# Patient Record
Sex: MALE | Race: WHITE | NOT HISPANIC OR LATINO | Employment: UNEMPLOYED | URBAN - METROPOLITAN AREA
[De-identification: names, ages, dates, MRNs, and addresses within clinical notes are randomized per-mention and may not be internally consistent; named-entity substitution may affect disease eponyms.]

---

## 2023-08-07 ENCOUNTER — OFFICE VISIT (OUTPATIENT)
Dept: FAMILY MEDICINE CLINIC | Facility: CLINIC | Age: 43
End: 2023-08-07
Payer: COMMERCIAL

## 2023-08-07 VITALS
OXYGEN SATURATION: 99 % | TEMPERATURE: 98 F | HEIGHT: 70 IN | WEIGHT: 242 LBS | SYSTOLIC BLOOD PRESSURE: 112 MMHG | DIASTOLIC BLOOD PRESSURE: 84 MMHG | RESPIRATION RATE: 14 BRPM | BODY MASS INDEX: 34.65 KG/M2 | HEART RATE: 59 BPM

## 2023-08-07 DIAGNOSIS — Z76.89 ESTABLISHING CARE WITH NEW DOCTOR, ENCOUNTER FOR: ICD-10-CM

## 2023-08-07 DIAGNOSIS — I10 HYPERTENSION, BENIGN: Primary | ICD-10-CM

## 2023-08-07 PROBLEM — K51.90 ULCERATIVE COLITIS: Status: ACTIVE | Noted: 2018-05-22

## 2023-08-07 PROCEDURE — 99202 OFFICE O/P NEW SF 15 MIN: CPT | Performed by: PHYSICIAN ASSISTANT

## 2023-08-07 RX ORDER — LISINOPRIL 40 MG/1
40 TABLET ORAL DAILY
COMMUNITY
Start: 2020-11-01 | End: 2023-08-07 | Stop reason: SDUPTHER

## 2023-08-07 RX ORDER — USTEKINUMAB 90 MG/ML
INJECTION, SOLUTION SUBCUTANEOUS
COMMUNITY
Start: 2021-01-01

## 2023-08-07 RX ORDER — LISINOPRIL 40 MG/1
40 TABLET ORAL DAILY
Qty: 90 TABLET | Refills: 2 | Status: SHIPPED | OUTPATIENT
Start: 2023-08-07

## 2023-08-07 NOTE — PROGRESS NOTES
"Chief Complaint  Establish Care and Hypertension (Medication refill )    Subjective          Fred Sewell IV presents to [facility]  History of Present Illness  Didier ,\"Ortiz\",is a 43-year-old male who presents to establish care with new provider with hypertension management.  States overall he is feeling well at office visit today.  Currently sees GI at [facility] for his ulcerative colitis.  Currently stable on medication.  He has been taking the lisinopril for the past several years for hypertension.  Does not check his blood pressure at home.  Has had no side effects with the lisinopril.  Currently he walks 1 and half miles daily.  Sleep has been normal.  Diet has been healthy.  Bowel movements are regular without dark black tarry stools.  Ortiz denied any chest pain, shortness of finger, cough, wheezing, abdominal pain or GI upset.     Objective   Vital Signs:   /84 (BP Location: Left arm, Patient Position: Sitting, Cuff Size: Adult)   Pulse 59   Temp 98 øF (36.7 øC)   Resp 14   Ht 177.8 cm (70\")   Wt 110 kg (242 lb)   SpO2 99%   BMI 34.72 kg/mý     Physical Exam  Vitals and nursing note reviewed.   Constitutional:       Appearance: Normal appearance. He is well-developed and well-groomed. He is obese.   HENT:      Head: Normocephalic and atraumatic.   Neck:      Thyroid: No thyroid mass, thyromegaly or thyroid tenderness.      Vascular: No carotid bruit.      Trachea: Trachea and phonation normal. No tracheal tenderness.   Cardiovascular:      Rate and Rhythm: Normal rate and regular rhythm.      Pulses: Normal pulses.      Heart sounds: Normal heart sounds, S1 normal and S2 normal. No murmur heard.  Pulmonary:      Effort: Pulmonary effort is normal.      Breath sounds: Normal breath sounds and air entry.   Abdominal:      General: Bowel sounds are normal.      Palpations: Abdomen is soft. There is no hepatomegaly.      Tenderness: There is no " "abdominal tenderness. There is no right CVA tenderness, left CVA tenderness, guarding or rebound. Negative signs include Shen's sign, Rovsing's sign, McBurney's sign, psoas sign and obturator sign.   Musculoskeletal:      Cervical back: Neck supple.      Right lower leg: No edema.      Left lower leg: No edema.   Skin:     General: Skin is warm and dry.      Capillary Refill: Capillary refill takes less than 2 seconds.      Comments: Tattoo on left forearm   Neurological:      Mental Status: He is alert and oriented to person, place, and time.   Psychiatric:         Attention and Perception: Attention and perception normal.         Mood and Affect: Mood and affect normal.         Speech: Speech normal.         Behavior: Behavior normal. Behavior is cooperative.         Thought Content: Thought content normal.         Cognition and Memory: Cognition and memory normal.         Judgment: Judgment normal.      Result Review :                 Assessment and Plan    Diagnoses and all orders for this visit:    1. Hypertension, benign (Primary)    2. Establishing care with new doctor, encounter for    Other orders  -     lisinopril (PRINIVIL,ZESTRIL) 40 MG tablet; Take 1 tablet by mouth Daily.  Dispense: 90 tablet; Refill: 2    Fred,\"Ortiz\" was seen in office today to establish care with new provider with hypertension.  I have reviewed his health history form with him today.  We will try to obtain prior medical records for my review.  I have rechecked blood pressure and got 112/84 in left arm.  Doing well with the blood pressure medication.  I refilled the lisinopril to pharmacy. Ortiz will return to office in 6 months for annual physical exam with hypertension management.    I spent 24 minutes caring for Fred on this date of service. This time includes time spent by me in the following activities:preparing for the visit, obtaining and/or reviewing a separately obtained history, performing a medically appropriate " examination and/or evaluation , counseling and educating the patient/family/caregiver, ordering medications, tests, or procedures, and documenting information in the medical record  Follow Up   Return in about 6 months (around 2/7/2024), or HTN labs prior, for Annual.  Patient was given instructions and counseling regarding his condition or for health maintenance advice. Please see specific information pulled into the AVS if appropriate.     [facility]  [address] [place] [id number]  Dept: [phone]  Dept Fax: [phone]  Loc: [phone]  Loc Fax: [phone]

## 2023-08-09 ENCOUNTER — PATIENT ROUNDING (BHMG ONLY) (OUTPATIENT)
Dept: FAMILY MEDICINE CLINIC | Facility: CLINIC | Age: 43
End: 2023-08-09
Payer: COMMERCIAL

## 2024-04-08 RX ORDER — LISINOPRIL 40 MG/1
40 TABLET ORAL DAILY
Qty: 90 TABLET | Refills: 0 | Status: SHIPPED | OUTPATIENT
Start: 2024-04-08

## 2024-04-09 RX ORDER — LISINOPRIL 40 MG/1
40 TABLET ORAL DAILY
Qty: 90 TABLET | Refills: 1 | OUTPATIENT
Start: 2024-04-09

## 2024-06-24 NOTE — PROGRESS NOTES
Patient ID: Fred Sewell IV is a 44 y.o. male     Patient Care Team:  Head, CRISTIANE Valdes as PCP - General (Nurse Practitioner)    Subjective     Chief Complaint   Patient presents with    [facility]    Hypertension    Annual Exam       History of Present Illness    Fred Sewell IV was a patient of Susan Orr PA-C who is no longer with our practice.  I will be taking over this patient's primary care.  This is the first time patient is seeing me.  Last seen Susan to [facility] on August 7, 2023.  This was for continued management concerning hypertension with lisinopril 40 mg daily.  He is past due for annual physical exam and blood work.    He presents to [facility] today for continued management of hypertension.     He is followed by [facility] for management of ulcerative colitis.  Last seen on June 6, 2023.  Last colonoscopy was Adriana 10, 2024.  He is on Stelara injections with good control of symptoms.    Has had recent blood work completed at [facility].    Health Habits:  Dental Exam: Up to date  Eye Exam: No problems.    Diet:  Exercise: 3 times/week.  Current exercise activities include: walking  Colonoscopy: 6/10/24.      He denies any complaints of fever, chills, cough, chest pain, shortness of air, abdominal pain, nausea, or any other concerns.     The following portions of the patient's history were reviewed and updated as appropriate: allergies, current medications, past family history, past medical history, past social history, past surgical history and problem list.       ROS    Vitals:    06/25/24 1309   BP: 126/84   Pulse: 75   Temp: 98.2 °F (36.8 °C)   SpO2: 96%       Documented weights    06/25/24 1309   Weight: 113 kg (249 lb 9.6 oz)     Body mass index is 35.81 kg/m².    No results found for this or any previous visit.    Data reviewed : labs  COMPREHENSIVE METABOLIC PANEL (06/25/2024 08:33)    CBC AND DIFFERENTIAL (06/25/2024 08:33)    Objective      Physical Exam  Constitutional:       Appearance: He is well-developed.   HENT:      Head: Normocephalic and atraumatic.      Right Ear: Tympanic membrane normal.      Left Ear: Tympanic membrane normal.   Eyes:      Pupils: Pupils are equal, round, and reactive to light.   Cardiovascular:      Rate and Rhythm: Normal rate and regular rhythm.      Heart sounds: Normal heart sounds. No murmur heard.  Pulmonary:      Effort: Pulmonary effort is normal.      Breath sounds: Normal breath sounds. No wheezing, rhonchi or rales.   Abdominal:      General: Bowel sounds are normal.      Palpations: Abdomen is soft.      Tenderness: There is no abdominal tenderness.   Musculoskeletal:         General: No tenderness. Normal range of motion.      Cervical back: Normal range of motion and neck supple.   Skin:     General: Skin is warm and dry.      Findings: No erythema or rash.   Neurological:      Mental Status: He is alert and oriented to person, place, and time.   Psychiatric:         Behavior: Behavior normal.         BMI is >= 30 and <35. (Class 1 Obesity). The following options were offered after discussion;: exercise counseling/recommendations, nutrition counseling/recommendations, and information on healthy weight added to patient's after visit summary       Assessment & Plan     Assessment/Plan     Diagnoses and all orders for this visit:    1. Annual physical exam (Primary)    2. Encounter for hepatitis C screening test for low risk patient  -     HCV Antibody Rfx To Qnt PCR    3. Hypertension, benign  -     Lipid Panel With / Chol / HDL Ratio  -     TSH Rfx On Abnormal To Free T4  -     lisinopril (PRINIVIL,ZESTRIL) 40 MG tablet; Take 1 tablet by mouth Daily.  Dispense: 90 tablet; Refill: 3    4. Hyperglycemia  -     Hemoglobin A1c    5. Screening for diabetes mellitus  -     Hemoglobin A1c          Summary:  Fred Sewell IV has been doing well since he was last seen.  Blood pressure currently stable at  126/84.  He had recent labs completed for GI however will check TSH, lipid panel, hemoglobin A1c in office today and notify of results.  If all are stable, strict return to office in 1 year for next annual physical.        Follow Up:  Return for Labs today - F/U 1 year annual with fasting labs week before.  .    In the meantime, instructed to contact us sooner for any problems or concerns.    Patient was given instructions and counseling regarding condition or for health maintenance advice.  Please see specific information pulled into the AVS if appropriate.          Mandy Watts, APRN  Family Medicine  [facility]  06/25/24  16:57 EDT

## 2024-06-25 ENCOUNTER — OFFICE VISIT (OUTPATIENT)
Dept: FAMILY MEDICINE CLINIC | Facility: CLINIC | Age: 44
End: 2024-06-25
Payer: COMMERCIAL

## 2024-06-25 VITALS
HEART RATE: 75 BPM | WEIGHT: 249.6 LBS | OXYGEN SATURATION: 96 % | SYSTOLIC BLOOD PRESSURE: 126 MMHG | TEMPERATURE: 98.2 F | HEIGHT: 70 IN | DIASTOLIC BLOOD PRESSURE: 84 MMHG | BODY MASS INDEX: 35.73 KG/M2

## 2024-06-25 DIAGNOSIS — I10 HYPERTENSION, BENIGN: ICD-10-CM

## 2024-06-25 DIAGNOSIS — Z00.00 ANNUAL PHYSICAL EXAM: Primary | ICD-10-CM

## 2024-06-25 DIAGNOSIS — Z11.59 ENCOUNTER FOR HEPATITIS C SCREENING TEST FOR LOW RISK PATIENT: ICD-10-CM

## 2024-06-25 DIAGNOSIS — R73.9 HYPERGLYCEMIA: ICD-10-CM

## 2024-06-25 DIAGNOSIS — Z13.1 SCREENING FOR DIABETES MELLITUS: ICD-10-CM

## 2024-06-25 PROCEDURE — 99396 PREV VISIT EST AGE 40-64: CPT | Performed by: NURSE PRACTITIONER

## 2024-06-25 RX ORDER — LISINOPRIL 40 MG/1
40 TABLET ORAL DAILY
Qty: 90 TABLET | Refills: 3 | Status: SHIPPED | OUTPATIENT
Start: 2024-06-25

## 2024-06-26 LAB
CHOLEST SERPL-MCNC: 289 MG/DL (ref 0–200)
CHOLEST/HDLC SERPL: 8.26 {RATIO}
HBA1C MFR BLD: 5.7 % (ref 4.8–5.6)
HCV AB SERPL QL IA: NORMAL
HCV IGG SERPL QL IA: NON REACTIVE
HDLC SERPL-MCNC: 35 MG/DL (ref 40–60)
LDLC SERPL CALC-MCNC: 168 MG/DL (ref 0–100)
TRIGL SERPL-MCNC: 441 MG/DL (ref 0–150)
TSH SERPL DL<=0.005 MIU/L-ACNC: 2.31 UIU/ML (ref 0.27–4.2)
VLDLC SERPL CALC-MCNC: 86 MG/DL (ref 5–40)

## 2024-06-28 DIAGNOSIS — E78.2 MIXED HYPERLIPIDEMIA: Primary | ICD-10-CM

## 2024-06-28 RX ORDER — ATORVASTATIN CALCIUM 10 MG/1
10 TABLET, FILM COATED ORAL DAILY
Qty: 90 TABLET | Refills: 0 | Status: SHIPPED | OUTPATIENT
Start: 2024-06-28

## 2024-09-25 DIAGNOSIS — E78.2 MIXED HYPERLIPIDEMIA: ICD-10-CM

## 2024-09-26 RX ORDER — ATORVASTATIN CALCIUM 10 MG/1
10 TABLET, FILM COATED ORAL DAILY
Qty: 90 TABLET | Refills: 0 | Status: SHIPPED | OUTPATIENT
Start: 2024-09-26

## 2024-12-17 DIAGNOSIS — E78.2 MIXED HYPERLIPIDEMIA: ICD-10-CM

## 2024-12-17 RX ORDER — ATORVASTATIN CALCIUM 10 MG/1
10 TABLET, FILM COATED ORAL DAILY
Qty: 90 TABLET | Refills: 0 | OUTPATIENT
Start: 2024-12-17

## 2024-12-18 DIAGNOSIS — E78.2 MIXED HYPERLIPIDEMIA: ICD-10-CM

## 2024-12-18 RX ORDER — ATORVASTATIN CALCIUM 10 MG/1
10 TABLET, FILM COATED ORAL DAILY
Qty: 90 TABLET | Refills: 0 | OUTPATIENT
Start: 2024-12-18

## 2024-12-19 DIAGNOSIS — E78.2 MIXED HYPERLIPIDEMIA: Primary | ICD-10-CM

## 2024-12-19 DIAGNOSIS — E78.2 MIXED HYPERLIPIDEMIA: ICD-10-CM

## 2024-12-19 RX ORDER — ATORVASTATIN CALCIUM 10 MG/1
10 TABLET, FILM COATED ORAL DAILY
Qty: 90 TABLET | Refills: 0 | OUTPATIENT
Start: 2024-12-19

## 2024-12-23 DIAGNOSIS — E78.2 MIXED HYPERLIPIDEMIA: ICD-10-CM

## 2024-12-23 RX ORDER — ATORVASTATIN CALCIUM 10 MG/1
10 TABLET, FILM COATED ORAL DAILY
Qty: 90 TABLET | Refills: 0 | Status: SHIPPED | OUTPATIENT
Start: 2024-12-23 | End: 2024-12-30

## 2024-12-30 DIAGNOSIS — E78.2 MIXED HYPERLIPIDEMIA: ICD-10-CM

## 2024-12-30 RX ORDER — ATORVASTATIN CALCIUM 20 MG/1
20 TABLET, FILM COATED ORAL DAILY
Qty: 90 TABLET | Refills: 1 | Status: SHIPPED | OUTPATIENT
Start: 2024-12-30

## 2025-06-11 DIAGNOSIS — R73.9 HYPERGLYCEMIA: ICD-10-CM

## 2025-06-11 DIAGNOSIS — Z00.00 ANNUAL PHYSICAL EXAM: Primary | ICD-10-CM

## 2025-06-11 DIAGNOSIS — E78.2 MIXED HYPERLIPIDEMIA: ICD-10-CM

## 2025-07-01 ENCOUNTER — OFFICE VISIT (OUTPATIENT)
Dept: FAMILY MEDICINE CLINIC | Facility: CLINIC | Age: 45
End: 2025-07-01
Payer: COMMERCIAL

## 2025-07-01 VITALS
TEMPERATURE: 98.9 F | WEIGHT: 252 LBS | HEART RATE: 70 BPM | BODY MASS INDEX: 36.08 KG/M2 | SYSTOLIC BLOOD PRESSURE: 120 MMHG | OXYGEN SATURATION: 98 % | HEIGHT: 70 IN | DIASTOLIC BLOOD PRESSURE: 70 MMHG

## 2025-07-01 DIAGNOSIS — Z23 IMMUNIZATION DUE: ICD-10-CM

## 2025-07-01 DIAGNOSIS — Z13.6 ENCOUNTER FOR SCREENING FOR CORONARY ARTERY DISEASE: ICD-10-CM

## 2025-07-01 DIAGNOSIS — I10 HYPERTENSION, BENIGN: ICD-10-CM

## 2025-07-01 DIAGNOSIS — E78.2 MIXED HYPERLIPIDEMIA: ICD-10-CM

## 2025-07-01 DIAGNOSIS — Z00.00 ANNUAL PHYSICAL EXAM: Primary | ICD-10-CM

## 2025-07-01 RX ORDER — ATORVASTATIN CALCIUM 20 MG/1
20 TABLET, FILM COATED ORAL DAILY
Qty: 90 TABLET | Refills: 3 | Status: SHIPPED | OUTPATIENT
Start: 2025-07-01

## 2025-07-01 RX ORDER — USTEKINUMAB-AAUZ 90 MG/ML
INJECTION, SOLUTION SUBCUTANEOUS
COMMUNITY
Start: 2025-05-13

## 2025-07-01 RX ORDER — LISINOPRIL 40 MG/1
40 TABLET ORAL DAILY
Qty: 90 TABLET | Refills: 3 | Status: SHIPPED | OUTPATIENT
Start: 2025-07-01

## 2025-07-01 NOTE — PROGRESS NOTES
Chief Complaint   Patient presents with    Annual Exam    Hypertension    Hyperlipidemia       Patient Care Team:  Head, CRISTIANE Valdes as PCP - General (Nurse Practitioner)    Terrie Sewell IV is a 45 y.o. male and is here for a yearly physical exam. The patient reports no problems.    History of Present Illness  The patient presents for a yearly checkup.    He is considering a cardiac evaluation due to a significant family history of heart disease, including his brother's first heart attack at the age of 45. He expresses concern about potential heart blockages and seeks advice on appropriate tests. He does not adhere to any specific diet and continues to walk about three times a week. He does not monitor his blood pressure at home and reports no symptoms of hypertension such as headaches. He is on Lipitor for cholesterol management and lisinopril for blood pressure control. He reports no side effects from his current medications, although he does experience knee pain, which he attributes to a pre-existing condition.    He is under the care of a gastroenterologist for ulcerative colitis, with his last colonoscopy performed in 06/2024. He is on injections for ulcerative colitis and has good control of symptoms. He has not received a tetanus vaccine in the past decade and declines the influenza vaccine due to previous adverse reactions.    FAMILY HISTORY  His brother had his first heart attack at age 45.    Results  Labs   - CBC: Normal   - Glucose: 109 mg/dL   - Kidney function: Normal   - Liver function tests: Normal   - Total cholesterol: 171 mg/dL   - LDL: 98 mg/dL   - Triglycerides: 252 mg/dL   - HDL: 30 mg/dL   - Thyroid function test: Normal   - Urine test: Normal   - Hemoglobin A1c: Prediabetic range    Health Habits:  Dental Exam: Up to date  Eye Exam: No problems.    Diet:  Exercise: 3 times/week.  Current exercise activities include: walking  Colonoscopy: 6/10/24.      The following portions  "of the patient's history were reviewed and updated as appropriate: allergies, current medications, past family history, past medical history, past social history, past surgical history, and problem list.    Social and Family and Surgical History reviewed and updated today, see Rooming tab.    Health History, Preventive Measures and Vaccination flow sheets reviewed and updated today.    Patient's current medical chart in Epic; including previous office notes, imaging, labs, specialist's evaluation either in notes or in Media tab reviewed today.    Other pertinent medical information also reviewed thru Care Everywhere function is also reviewed today.    Review of Systems  Review of Systems  Vitals:    07/01/25 1315   BP: 120/70   BP Location: Left arm   Patient Position: Sitting   Cuff Size: Adult   Pulse: 70   Temp: 98.9 °F (37.2 °C)   SpO2: 98%   Weight: 114 kg (252 lb)   Height: 177.8 cm (70\")     Wt Readings from Last 3 Encounters:   07/01/25 114 kg (252 lb)   06/25/24 113 kg (249 lb 9.6 oz)   08/07/23 110 kg (242 lb)       Physical Exam  Vitals reviewed.   Constitutional:       General: He is not in acute distress.     Appearance: He is well-developed.   HENT:      Head: Normocephalic and atraumatic.      Right Ear: Tympanic membrane normal.      Left Ear: Tympanic membrane normal.      Nose: No congestion.   Eyes:      Pupils: Pupils are equal, round, and reactive to light.   Cardiovascular:      Rate and Rhythm: Normal rate and regular rhythm.      Heart sounds: Normal heart sounds. No murmur heard.  Pulmonary:      Effort: Pulmonary effort is normal.      Breath sounds: Normal breath sounds. No wheezing, rhonchi or rales.   Abdominal:      General: Bowel sounds are normal.      Palpations: Abdomen is soft. There is no hepatomegaly or splenomegaly.      Tenderness: There is no abdominal tenderness.   Musculoskeletal:         General: No tenderness. Normal range of motion.      Cervical back: Normal range of " motion and neck supple.      Right lower leg: No edema.      Left lower leg: No edema.   Skin:     General: Skin is warm and dry.      Findings: No erythema or rash.   Neurological:      Mental Status: He is alert and oriented to person, place, and time.   Psychiatric:         Mood and Affect: Mood normal.         Behavior: Behavior normal.         ECG 12 Lead    Date/Time: 7/1/2025 3:18 PM  Performed by: Mandy Watts APRN    Authorized by: Mandy Watts APRN  Previous ECG: no previous ECG available  Rhythm: sinus rhythm  Rate: normal  Conduction: conduction normal  ST Segments: ST segments normal  T Waves: T waves normal  QRS axis: normal  Other: no other findings    Clinical impression: normal ECG          The 10-year ASCVD risk score (Luis Felipe DHILLON, et al., 2019) is: 3.1%    Values used to calculate the score:      Age: 45 years      Sex: Male      Is Non- : No      Diabetic: No      Tobacco smoker: No      Systolic Blood Pressure: 120 mmHg      Is BP treated: Yes      HDL Cholesterol: 30 mg/dL      Total Cholesterol: 171 mg/dL     Results for orders placed or performed in visit on 06/19/25   CBC (No Diff)    Collection Time: 06/19/25  8:27 AM    Specimen: Blood   Result Value Ref Range    WBC 8.8 3.4 - 10.8 x10E3/uL    RBC 5.44 4.14 - 5.80 x10E6/uL    Hemoglobin 15.7 13.0 - 17.7 g/dL    Hematocrit 47.8 37.5 - 51.0 %    MCV 88 79 - 97 fL    MCH 28.9 26.6 - 33.0 pg    MCHC 32.8 31.5 - 35.7 g/dL    RDW 14.0 11.6 - 15.4 %    Platelets 278 150 - 450 x10E3/uL   Comprehensive Metabolic Panel    Collection Time: 06/19/25  8:27 AM    Specimen: Blood   Result Value Ref Range    Glucose 109 (H) 70 - 99 mg/dL    BUN 19 6 - 24 mg/dL    Creatinine 1.03 0.76 - 1.27 mg/dL    EGFR Result 91 >59 mL/min/1.73    BUN/Creatinine Ratio 18 9 - 20    Sodium 140 134 - 144 mmol/L    Potassium 4.4 3.5 - 5.2 mmol/L    Chloride 105 96 - 106 mmol/L    Total CO2 20 20 - 29 mmol/L    Calcium 9.0 8.7 - 10.2 mg/dL    Total  Protein 6.9 6.0 - 8.5 g/dL    Albumin 4.1 4.1 - 5.1 g/dL    Globulin 2.8 1.5 - 4.5 g/dL    Total Bilirubin 0.6 0.0 - 1.2 mg/dL    Alkaline Phosphatase 82 44 - 121 IU/L    AST (SGOT) 22 0 - 40 IU/L    ALT (SGPT) 27 0 - 44 IU/L   Lipid Panel With / Chol / HDL Ratio    Collection Time: 06/19/25  8:27 AM    Specimen: Blood   Result Value Ref Range    Total Cholesterol 171 100 - 199 mg/dL    Triglycerides 252 (H) 0 - 149 mg/dL    HDL Cholesterol 30 (L) >39 mg/dL    VLDL Cholesterol Vance 43 (H) 5 - 40 mg/dL    LDL Chol Calc (NIH) 98 0 - 99 mg/dL    Chol/HDL Ratio 5.7 (H) 0.0 - 5.0 ratio   TSH Rfx On Abnormal To Free T4    Collection Time: 06/19/25  8:27 AM    Specimen: Blood   Result Value Ref Range    TSH 1.140 0.450 - 4.500 uIU/mL   UA / M With / Rflx Culture([facility] ONLY) - Urine, Clean Catch    Collection Time: 06/19/25  8:27 AM    Specimen: Urine, Clean Catch   Result Value Ref Range    Specific Gravity, UA 1.023 1.005 - 1.030    pH, UA 5.5 5.0 - 7.5    Color, UA Yellow Yellow    Appearance, UA Clear Clear    Leukocytes, UA Negative Negative    Protein Trace Negative/Trace    Glucose, UA Negative Negative    Ketones Negative Negative    Blood, UA Negative Negative    Bilirubin, UA Negative Negative    Urobilinogen, UA 0.2 0.2 - 1.0 mg/dL    Nitrite, UA Negative Negative    Microscopic Examination Comment     Microscopic Examination See below:     Urinalysis Reflex Comment    Hemoglobin A1c    Collection Time: 06/19/25  8:27 AM    Specimen: Blood   Result Value Ref Range    Hemoglobin A1C 5.9 (H) 4.8 - 5.6 %   Microscopic Examination -    Collection Time: 06/19/25  8:27 AM   Result Value Ref Range    WBC, UA None seen 0 - 5 /hpf    RBC, UA None seen 0 - 2 /hpf    Epithelial Cells (non renal) None seen 0 - 10 /hpf    Casts None seen None seen /lpf    Bacteria, UA None seen None seen/Few     Assessment & Plan     Assessment & Plan  1. Health maintenance.  - Weight increased by 3 pounds over the past year.  - CBC, kidney  function, liver function tests, and thyroid function test are all within normal limits. Urine test normal. Glucose level slightly elevated at 109, hemoglobin A1c in prediabetic range. Total cholesterol improved from 289 to 171, LDL from 168 to 98, triglycerides from 441 to 252, HDL at 30.  - Advised to monitor sugar and carbohydrate intake, and incorporate at least 150 minutes of exercise per week. Encouraged to consume healthy oils, almonds, and avocados to help increase HDL levels.  - EKG will be performed today to assess cardiac health. CT cardiac calcium score will be ordered. Tetanus vaccine will be administered today.    2. Ulcerative colitis.  - Continues to see a GI specialist for management. Last colonoscopy in 06/2024.  - On injections with good control of symptoms.  - Reviewed records and confirmed no changes in management.  - Continue current therapy.    3. Hyperlipidemia.  - Currently on Lipitor for cholesterol management.  - Lipid profile shows improvement: total cholesterol 171, LDL 98, triglycerides 252, HDL 30.  - Awaiting CT cardiac calcium score to determine further recommendations.      4. Hypertension.  - On lisinopril for blood pressure management.  - Blood pressure today is 120/70 mmHg.  - Reviewed effectiveness of current medication.  - Refill for lisinopril provided.    Diagnoses and all orders for this visit:    1. Annual physical exam (Primary)  -     ECG 12 Lead    2. Immunization due  -     Tdap Vaccine Greater Than or Equal To 6yo IM    3. Mixed hyperlipidemia  -     CT Cardiac Calcium Score Without Dye; Future  -     atorvastatin (LIPITOR) 20 MG tablet; Take 1 tablet by mouth Daily.  Dispense: 90 tablet; Refill: 3    4. Encounter for screening for coronary artery disease  -     CT Cardiac Calcium Score Without Dye; Future    5. Hypertension, benign  -     lisinopril (PRINIVIL,ZESTRIL) 40 MG tablet; Take 1 tablet by mouth Daily.  Dispense: 90 tablet; Refill: 3        1. Patient  Counseling:  --Nutrition: Stressed importance of moderation in sodium/caffeine intake, saturated fat and cholesterol.  Discussed caloric balance, sufficient intake of fresh fruits, vegetables, fiber,    calcium, iron.  --Exercise: Stressed the importance of regular exercise.   --Substance Abuse: Discussed cessation/primary prevention of tobacco, alcohol, or other drug use; driving or other dangerous activities under the influence.    --Dental health: Discussed importance of regular tooth brushing, flossing, and dental visits.  --Suggested having eyes and vision checked if needed or past due.  --Immunizations reviewed.  --Discussed benefits of screening colonoscopy.  2. Discussed the patient's BMI with him.  The BMI is above average; BMI management plan is completed  3. Follow up next physical in 1 year    Patient was given instructions and counseling regarding condition or for health maintenance advice.  Please see specific information pulled into the AVS if appropriate.      Medications Discontinued During This Encounter   Medication Reason    lisinopril (PRINIVIL,ZESTRIL) 40 MG tablet Reorder    atorvastatin (LIPITOR) 20 MG tablet Reorder          Patient or patient representative verbalized consent for the use of Ambient Listening during the visit with  CRISTIANE Aldana for chart documentation. 7/1/2025  15:16 EDT    CRISTIANE Chávez  Family Practice  [facility]

## 2025-08-20 DIAGNOSIS — E78.2 MIXED HYPERLIPIDEMIA: ICD-10-CM

## 2025-08-21 DIAGNOSIS — E78.2 MIXED HYPERLIPIDEMIA: ICD-10-CM

## 2025-08-21 RX ORDER — ATORVASTATIN CALCIUM 40 MG/1
40 TABLET, FILM COATED ORAL DAILY
Qty: 90 TABLET | Refills: 1 | Status: SHIPPED | OUTPATIENT
Start: 2025-08-21